# Patient Record
Sex: MALE | Employment: FULL TIME | URBAN - METROPOLITAN AREA
[De-identification: names, ages, dates, MRNs, and addresses within clinical notes are randomized per-mention and may not be internally consistent; named-entity substitution may affect disease eponyms.]

---

## 2017-11-01 ENCOUNTER — OFFICE VISIT (OUTPATIENT)
Dept: FAMILY MEDICINE CLINIC | Age: 27
End: 2017-11-01

## 2017-11-01 VITALS
WEIGHT: 149.4 LBS | OXYGEN SATURATION: 99 % | HEART RATE: 74 BPM | TEMPERATURE: 97.4 F | DIASTOLIC BLOOD PRESSURE: 66 MMHG | HEIGHT: 66 IN | RESPIRATION RATE: 17 BRPM | SYSTOLIC BLOOD PRESSURE: 110 MMHG | BODY MASS INDEX: 24.01 KG/M2

## 2017-11-01 DIAGNOSIS — G43.709 CHRONIC MIGRAINE WITHOUT AURA WITHOUT STATUS MIGRAINOSUS, NOT INTRACTABLE: ICD-10-CM

## 2017-11-01 DIAGNOSIS — T14.8XXA MUSCLE STRAIN: Primary | ICD-10-CM

## 2017-11-01 RX ORDER — PROPRANOLOL HYDROCHLORIDE 40 MG/1
40 TABLET ORAL 2 TIMES DAILY
Qty: 60 TAB | Refills: 1 | Status: SHIPPED | OUTPATIENT
Start: 2017-11-01

## 2017-11-01 NOTE — MR AVS SNAPSHOT
Visit Information Date & Time Provider Department Dept. Phone Encounter #  
 11/1/2017  3:30 PM Redd Mays, [facility] [phone] [id number] Follow-up Instructions Return in about 1 month (around 12/1/2017) for migraine follow up. Upcoming Health Maintenance Date Due DTaP/Tdap/Td series (1 - Tdap) 11/5/2012 INFLUENZA AGE 9 TO ADULT 8/1/2017 Allergies as of 11/1/2017  Review Complete On: 11/1/2017 By: Redd Mays MD  
  
 Severity Noted Reaction Type Reactions Penicillins High 10/30/2017    Anaphylaxis Current Immunizations  Never Reviewed No immunizations on file. Not reviewed this visit You Were Diagnosed With   
  
 Codes Comments Muscle strain    -  Primary ICD-10-CM: T14. Terijustyn Cooley ICD-9-CM: 675. 9 Chronic migraine without aura without status migrainosus, not intractable     ICD-10-CM: I23.326 ICD-9-CM: 346.70 Vitals BP Pulse Temp Resp Height(growth percentile) Weight(growth percentile) 110/66 74 97.4 °F (36.3 °C) (Oral) 17 5' 6\" (1.676 m) 149 lb 6.4 oz (67.8 kg) SpO2 BMI Smoking Status 99% 24.11 kg/m2 Never Smoker Vitals History BMI and BSA Data Body Mass Index Body Surface Area  
 24.11 kg/m 2 1.78 m 2 Preferred Pharmacy Pharmacy Name Phone [facility] [address] [place] [address], [address] [place] [address] Your Updated Medication List  
  
   
This list is accurate as of: 11/1/17  4:01 PM.  Always use your most recent med list.  
  
  
  
  
 ibuprofen 800 mg tablet Commonly known as:  MOTRIN Take 1 Tab by mouth every eight (8) hours as needed for Pain for up to 7 days. methocarbamol 750 mg tablet Commonly known as:  [id number] Take 1 Tab by mouth four (4) times daily. propranolol 40 mg tablet Commonly known as:  INDERAL Take 1 Tab by mouth two (2) times a day. Prescriptions Sent to Pharmacy Refills propranolol (INDERAL) 40 mg tablet 1 Sig: Take 1 Tab by mouth two (2) times a day. Class: Normal  
 Pharmacy: [address] [facility] #: [phone] Route: Oral  
  
Follow-up Instructions Return in about 1 month (around 12/1/2017) for migraine follow up. Introducing [facility] SERVICES! Steve Long introduces [employer] patient portal. Now you can access parts of your medical record, email your doctor's office, and request medication refills online. 1. In your internet browser, go to [email or web address] 2. Click on the First Time User? Click Here link in the Sign In box. You will see the New Member Sign Up page. 3. Enter your [employer] Access Code exactly as it appears below. You will not need to use this code after youve completed the sign-up process. If you do not sign up before the expiration date, you must request a new code. · [employer] Access Code: [email or web address] Expires: 1/28/2018 12:02 PM 
 
4. Enter the last four digits of your Social Security Number (xxxx) and Date of Birth (mm/dd/yyyy) as indicated and click Submit. You will be taken to the next sign-up page. 5. Create a [employer]ma ID. This will be your [employer] login ID and cannot be changed, so think of one that is secure and easy to remember. 6. Create a [employer] password. You can change your password at any time. 7. Enter your Password Reset Question and Answer. This can be used at a later time if you forget your password. 8. Enter your e-mail address. You will receive e-mail notification when new information is available in [address] 9. Click Sign Up. You can now view and download portions of your medical record. 10. Click the Download Summary menu link to download a portable copy of your medical information. If you have questions, please visit the Frequently Asked Questions section of the [employer] website.  Remember, [employer] is NOT to be used for urgent needs. For medical emergencies, dial 911. Now available from your iPhone and Android! Please provide this summary of care documentation to your next provider. Your primary care clinician is listed as Antelmo Mullen. If you have any questions after today's visit, please call [phone].

## 2017-11-01 NOTE — PROGRESS NOTES
1. Have you been to the ER, urgent care clinic since your last visit? Hospitalized since your last visit? Yes When: Patient was at [facility] on 10-    2. Have you seen or consulted any other health care providers outside of the [address] [place] [address] since your last visit? Include any pap smears or colon screening.  No       Patient here today for an ER follow-up from [facility] for muscular pain  He was jumping with his children at a [place] [facility] and started to get sever muscle cramping

## 2017-11-01 NOTE — LETTER
NOTIFICATION OF RETURN TO WORK / SCHOOL 
 
11/1/2017 Mr. Lindsey Ventura [address] [facility] [id number] To Whom It May Concern: 
Lindsey Ventura is  under the care of [facility] He is cleared to return back to work with no restrictions. If there are questions or concerns please have the patient contact our office. Sincerely, Emily Luo MD

## 2017-11-01 NOTE — PROGRESS NOTES
Mani Dick    CC: Migraines and follow up of ED visit for back pain    HPI:     Back Pain:  - On 10/29/2017 he was jumping on a trampoline and injured his back (No fall or trauma)  - Pain was sharp and localized to lower back  - Went to ED on 10/30/2017  - Diagnosed with a lumbar strain  - Started on robaxin and naproxen regimen  - Pain fully resolved by 10/31/2017  - Has not been using any pain medication  - Needs work note    Migraines:   - Occurring up to 6 days of the week  - Unifocal  - Last all day if he doesn't take anything  - Excedrin helps relieve headache. - Associated with nausea and light sensitivty. - Not using any prophylaxis    ROS: Positive items marked in RED  CON: fever, chills, fatigue  HEENT: HA, watery eyes, sore throat  Cardiovascular: palpitations, CP  Resp: cough, SOB  GI: nausea, vomiting, diarrhea  SKIN: rash, itching  : dysuria, hematuria  MS: arthralgias, myalgias  Neuro: numbness, tingling, weakness    Past Medical History:   Diagnosis Date    Migraines        History reviewed. No pertinent surgical history. Family History   Problem Relation Age of Onset    Headache Sister        Social History     Social History    Marital status: UNKNOWN     Spouse name: N/A    Number of children: N/A    Years of education: N/A     Social History Main Topics    Smoking status: Never Smoker    Smokeless tobacco: Never Used    Alcohol use Yes      Comment: soc    Drug use: No    Sexual activity: Yes     Partners: Female     Other Topics Concern    None     Social History Narrative       Allergies   Allergen Reactions    Penicillins Anaphylaxis       Current Outpatient Prescriptions:     ibuprofen (MOTRIN) 800 mg tablet, Take 1 Tab by mouth every eight (8) hours as needed for Pain for up to 7 days. , Disp: 30 Tab, Rfl: 0    Physical Exam:      /66  Pulse 74  Temp 97.4 °F (36.3 °C) (Oral)   Resp 17  Ht 5' 6\" (1.676 m)  Wt 149 lb 6.4 oz (67.8 kg)  SpO2 99% BMI 24.11 kg/m2    General:  WD, WN, NAD  Eyes: sclera clear bilaterally, no discharge noted, eyelids normal in appearance, EOMI, PERRLA  HENT: NCAT  Lungs: CTAB, Normal respiratory effort and rate  CV: RRR, no MRGs  ABD: soft, non-tender, non-distended  BACK: no tenderness, swelling, erythema, or calor noted  Skin: normal temperature, turgor, color, and texture  Psych: alert and oriented to person, place and time, normal affect  Neuro: Speech normal, Moving all extremities, gait normal      Assessment/Plan   Avni Meza is a 23 y.o. male with a PMH significant for migraines, presenting with resolved muscle strain and uncontrolled migraines.       Migraines, Inadequately Controlled:  - Will start on migraine prophylaxis with propranolol  - Patient instructed to continue current abortive therapy as it is effective  - Follow up in one month    Lumbar Strain, Resolved:  - Patient clear for work  - Will write work note          Christina Stoddard MD  11/1/2017, 3:45 PM